# Patient Record
Sex: MALE | Race: WHITE
[De-identification: names, ages, dates, MRNs, and addresses within clinical notes are randomized per-mention and may not be internally consistent; named-entity substitution may affect disease eponyms.]

---

## 2018-09-03 ENCOUNTER — HOSPITAL ENCOUNTER (EMERGENCY)
Dept: HOSPITAL 62 - ER | Age: 23
Discharge: HOME | End: 2018-09-03
Payer: COMMERCIAL

## 2018-09-03 VITALS — DIASTOLIC BLOOD PRESSURE: 81 MMHG | SYSTOLIC BLOOD PRESSURE: 141 MMHG
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
## 2018-09-03 DIAGNOSIS — M79.602: ICD-10-CM

## 2018-09-03 DIAGNOSIS — M79.89: ICD-10-CM

## 2018-09-03 DIAGNOSIS — S50.12XA: Primary | ICD-10-CM

## 2018-09-03 DIAGNOSIS — S50.312A: ICD-10-CM

## 2018-09-03 PROCEDURE — 99283 EMERGENCY DEPT VISIT LOW MDM: CPT
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
